# Patient Record
Sex: MALE | Race: WHITE | Employment: FULL TIME | URBAN - NONMETROPOLITAN AREA
[De-identification: names, ages, dates, MRNs, and addresses within clinical notes are randomized per-mention and may not be internally consistent; named-entity substitution may affect disease eponyms.]

---

## 2022-10-11 ENCOUNTER — HOSPITAL ENCOUNTER (EMERGENCY)
Age: 32
Discharge: HOME | End: 2022-10-11
Attending: STUDENT IN AN ORGANIZED HEALTH CARE EDUCATION/TRAINING PROGRAM
Payer: COMMERCIAL

## 2022-10-11 VITALS
OXYGEN SATURATION: 96 % | RESPIRATION RATE: 17 BRPM | HEART RATE: 86 BPM | DIASTOLIC BLOOD PRESSURE: 83 MMHG | TEMPERATURE: 98.9 F | SYSTOLIC BLOOD PRESSURE: 133 MMHG

## 2022-10-11 DIAGNOSIS — S05.51XA FOREIGN BODY IN LENS OF RIGHT EYE, INITIAL ENCOUNTER: Primary | ICD-10-CM

## 2022-10-11 PROCEDURE — 99282 EMERGENCY DEPT VISIT SF MDM: CPT | Performed by: STUDENT IN AN ORGANIZED HEALTH CARE EDUCATION/TRAINING PROGRAM

## 2022-10-11 RX ORDER — TETRACAINE HYDROCHLORIDE 5 MG/ML
2 SOLUTION OPHTHALMIC ONCE
Status: DISCONTINUED | OUTPATIENT
Start: 2022-10-11 | End: 2022-10-11 | Stop reason: HOSPADM

## 2022-10-11 ASSESSMENT — ENCOUNTER SYMPTOMS
NAUSEA: 0
WHEEZING: 0
DIARRHEA: 0
SHORTNESS OF BREATH: 0
EYE PAIN: 0
VOMITING: 0
COUGH: 0
RHINORRHEA: 0
SORE THROAT: 0
ABDOMINAL DISTENTION: 0
EYE DISCHARGE: 0
ABDOMINAL PAIN: 0

## 2022-10-12 NOTE — ED PROVIDER NOTES
[facility]          Pt Name: Jeff Wallace  MRN: [id number]  [place] 1990  Date of evaluation: 10/11/2022  Treating Resident Physician: Roger Liao MD  Supervising Physician: Cindy De MD     History obtained from the patient. CHIEF COMPLAINT       Chief Complaint   Patient presents with    Eye Injury     right           HISTORY OF PRESENT ILLNESS    HPI  Jeff Wallace is a 29 y.o. male who presents to the emergency department for evaluation of eye injury. Pt stated he got a piece metal in his right eye while working as a . Pt states that this occurred about 4 hrs prior to arrival. Pt pain is 6/10 on pain scale. The patient has no other acute complaints at this time. REVIEW OF SYSTEMS   Review of Systems   Constitutional:  Negative for fatigue and fever. HENT:  Negative for congestion, ear pain, rhinorrhea and sore throat. Eyes:  Negative for pain and discharge. Foreign body right eye   Respiratory:  Negative for cough, shortness of breath and wheezing. Cardiovascular:  Negative for chest pain, palpitations and leg swelling. Gastrointestinal:  Negative for abdominal distention, abdominal pain, diarrhea, nausea and vomiting. Genitourinary:  Negative for difficulty urinating, flank pain and frequency. Musculoskeletal:  Negative for arthralgias. Neurological:  Negative for dizziness, tremors, syncope, weakness and numbness. PAST MEDICAL AND SURGICAL HISTORY   No past medical history on file. No past surgical history on file. MEDICATIONS   No current facility-administered medications for this encounter. No current outpatient medications on file. SOCIAL HISTORY     Social History     Social History Narrative    Not on file            ALLERGIES   No Known Allergies      FAMILY HISTORY   No family history on file. PREVIOUS RECORDS   Previous records reviewed:  none .         PHYSICAL EXAM     ED Triage Vitals [10/11/22 1636]   BP Temp Temp src Heart Rate Resp SpO2 Height Weight   133/83 98.9 °F (37.2 °C) -- 86 17 96 % -- --     Initial vital signs and nursing assessment reviewed and normal. There is no height or weight on file to calculate BMI. Pulsoximetry is normal per my interpretation. Additional Vital Signs:  Vitals:    10/11/22 1636   BP: 133/83   Pulse: 86   Resp: 17   Temp: 98.9 °F (37.2 °C)   SpO2: 96%       Physical Exam  Constitutional:       Appearance: Normal appearance. HENT:      Head: Normocephalic. Right Ear: External ear normal.      Left Ear: External ear normal.      Nose: Nose normal.      Mouth/Throat:      Mouth: Mucous membranes are moist.      Pharynx: Oropharynx is clear. Eyes:      Conjunctiva/sclera: Conjunctivae normal.      Pupils: Pupils are equal, round, and reactive to light. Comments: Using fluorescein and slit lamp Foreign body right eye at 3 and 6 oclock. Cardiovascular:      Rate and Rhythm: Normal rate and regular rhythm. Pulses: Normal pulses. Heart sounds: Normal heart sounds. Pulmonary:      Effort: Pulmonary effort is normal.      Breath sounds: Normal breath sounds. Abdominal:      General: Bowel sounds are normal.      Palpations: Abdomen is soft. Musculoskeletal:         General: Normal range of motion. Cervical back: Normal range of motion and neck supple. Skin:     General: Skin is warm and dry. Capillary Refill: Capillary refill takes less than 2 seconds. Neurological:      General: No focal deficit present. Mental Status: He is alert. 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 Pt on exam noted with small foreign bodies at 3 and 6 oclock. Pt tetanus is up to date. Plan:   Attempt removal of foreign body.     Dr. Jem Carmen and myself attempted foreign body removal. Pt tolerated well but had frequent blinking. Unable to remove foreign body. Paged on call opthalmology Dr. Samantha Warren. Awaiting his return call when patient stated he did not want to wait and that he would follow up with his ophthalmologist  in [facility]. Discussed concerns with patient including need for immediate follow up for removal of foreign body and risks including worsening of condition, infection and permanent vision loss. Pt verbalized good understanding re: precautions and concerns and wanted to leave at this time. Pt was given local opthalmologist contact information at time of discharge as well. ED RESULTS   Laboratory results:  Labs Reviewed - No data to display    Radiologic studies results:  No orders to display       ED Medications administered this visit: Medications - No data to display      ED COURSE         Strict return precautions and follow up instructions were discussed with the patient prior to discharge, with which the patient agrees. MEDICATION CHANGES   There are no discharge medications for this patient. FINAL DISPOSITION     Final diagnoses:   Foreign body in lens of right eye, initial encounter     Condition: condition: fair  Dispo: Discharge to home      This transcription was electronically signed. Parts of this transcriptions may have been dictated by use of voice recognition software and electronically transcribed, and parts may have been transcribed with the assistance of an ED scribe. The transcription may contain errors not detected in proofreading. Please refer to my supervising physician's documentation if my documentation differs.     Electronically Signed: Jeffrey Yañez MD, 10/11/22, 10:30 PM        Jeffrey Yañez MD  Resident  10/11/22 [id number]